# Patient Record
Sex: FEMALE | Race: WHITE
[De-identification: names, ages, dates, MRNs, and addresses within clinical notes are randomized per-mention and may not be internally consistent; named-entity substitution may affect disease eponyms.]

---

## 2017-07-25 ENCOUNTER — HOSPITAL ENCOUNTER (EMERGENCY)
Dept: HOSPITAL 25 - UCEAST | Age: 37
Discharge: HOME | End: 2017-07-25
Payer: COMMERCIAL

## 2017-07-25 VITALS — SYSTOLIC BLOOD PRESSURE: 139 MMHG | DIASTOLIC BLOOD PRESSURE: 86 MMHG

## 2017-07-25 DIAGNOSIS — R03.0: ICD-10-CM

## 2017-07-25 DIAGNOSIS — J02.8: Primary | ICD-10-CM

## 2017-07-25 DIAGNOSIS — Z88.1: ICD-10-CM

## 2017-07-25 DIAGNOSIS — Z88.2: ICD-10-CM

## 2017-07-25 DIAGNOSIS — R50.9: ICD-10-CM

## 2017-07-25 DIAGNOSIS — E28.2: ICD-10-CM

## 2017-07-25 DIAGNOSIS — Z91.048: ICD-10-CM

## 2017-07-25 DIAGNOSIS — F90.9: ICD-10-CM

## 2017-07-25 DIAGNOSIS — E66.9: ICD-10-CM

## 2017-07-25 PROCEDURE — G0463 HOSPITAL OUTPT CLINIC VISIT: HCPCS

## 2017-07-25 PROCEDURE — 99202 OFFICE O/P NEW SF 15 MIN: CPT

## 2017-07-25 PROCEDURE — 87651 STREP A DNA AMP PROBE: CPT

## 2017-07-25 NOTE — UC
Throat Pain/Nasal Jewel HPI





- HPI Summary


HPI Summary: 





38 y/o female present to the urgent care c/o of body aches, sore throat, 

headache since last night. Pt reports she woke up this morning with body aches 

that feels like razor blades going through her body, fatigue.  Pt states her 

throat is burning with difficulty swallowing.   She took some advil around 3 am 

after feeling warm.  Pain is 8/10 w/ swallowing, Denies SOB, cough, nasal 

congestion, chest pain, N/V/D or urinary symptoms.  Pt has not other complains.














- History of Current Complaint


Hx Obtained From: Patient


Hx Last Menstrual Period: PCOS last period middle of june


Pregnant?: No


Onset/Duration: Gradual Onset, Lasting Days, Still Present


Severity: Moderate


Pain Intensity: 8


Pain Scale Used: 0-10 Numeric


Associated Signs & Symptoms: Positive: Dysphagia, Fever.  Negative: Sinus 

Discomfort, Nasal Discharge, Vomiting, Rash





- Epiglottits Risk Factors


Epiglottis Risk Factors: Negative





<Lizzette Lynch - Last Filed: 07/27/17 17:12>





<Cathy Franz - Last Filed: 07/27/17 20:48>





- History of Current Complaint


Chief Complaint: UCGeneralIllness


Stated Complaint: BODY ACHES,HEADACHE/TIREDNESS


Time Seen by Provider: 07/25/17 20:47





- Allergies/Home Medications


Allergies/Adverse Reactions: 


 Allergies











Allergy/AdvReac Type Severity Reaction Status Date / Time


 


Ciprofloxacin [From Cipro] Allergy Severe Rash Verified 07/25/17 17:46


 


Sulfa Drugs Allergy Severe Rash Verified 07/25/17 17:46


 


Sulfamethoxazole Allergy Severe Rash Verified 07/25/17 17:46





w/Trimethoprim     





[From Bactrim]     


 


BANDAID Allergy Severe Rash Uncoded 07/25/17 17:46











Home Medications: 


 Home Medications





Ibuprofen TAB* [Advil TAB*] 400 mg PO QID 07/25/17 [History Confirmed 07/25/17]


Spironolactone TAB* [Aldactone TAB 25 MG*] 50 mg PO BID 07/25/17 [History 

Confirmed 07/25/17]











PMH/Surg Hx/FS Hx/Imm Hx


Previously Healthy: Yes


Other GI/ History: PCOS


Other Psychological History: ADHD





- Surgical History


Surgical History: None





- Family History


Known Family History: Positive: Cardiac Disease


Family History: Marfans





- Social History


Occupation: Employed Full-time


Lives: With Family


Alcohol Use: Rare


Substance Use Type: None


Smoking Status (MU): Never Smoked Tobacco





<OsmanjaLizzette yen - Last Filed: 07/27/17 17:12>





Review of Systems


Constitutional: Fever - at home


Skin: Negative


Eyes: Negative


ENT: Sore Throat


Respiratory: Negative


Cardiovascular: Negative


Gastrointestinal: Negative


Genitourinary: Negative


Motor: Negative


Neurovascular: Negative


Musculoskeletal: Negative


Neurological: Negative


Psychological: Negative


All Other Systems Reviewed And Are Negative: Yes





<OsmanjaLizzette yen - Last Filed: 07/27/17 17:12>





Physical Exam


Triage Information Reviewed: Yes


Appearance: Well-Appearing, No Pain Distress, Well-Nourished, Obese


Vital Signs: 


 Initial Vital Signs











Temp  99.5 F   07/25/17 17:42


 


Pulse  97   07/25/17 17:42


 


Resp  16   07/25/17 17:42


 


BP  145/98   07/25/17 17:42


 


Pulse Ox  98   07/25/17 17:42











Vital Signs Reviewed: Yes


Eye Exam: Normal


Eyes: Positive: Conjunctiva Clear - PERRLA, EOMI, fundi grossly normal


ENT: Positive: Normal ENT inspection, Hearing grossly normal, Pharyngeal 

erythema, TMs normal, Tonsillar swelling, Tonsillar exudate


Dental Exam: Normal


Neck exam: Normal


Neck: Positive: Supple, Nontender, Enlarged Nodes @ - anterior cervical 

lymphnodes tender on palpation


Respiratory Exam: Normal


Respiratory: Positive: Chest non-tender, Lungs clear, Normal breath sounds


Cardiovascular Exam: Normal


Cardiovascular: Positive: RRR, No Murmur, Pulses Normal, Brisk Capillary Refill


Abdominal Exam: Normal


Abdomen Description: Positive: Nontender, No Organomegaly, Soft.  Negative: CVA 

Tenderness (R), CVA Tenderness (L)


Bowel Sounds: Positive: Present


Musculoskeletal Exam: Normal


Musculoskeletal: Positive: Strength Intact, ROM Intact, No Edema


Neurological Exam: Normal


Psychological Exam: Normal


Skin Exam: Normal





<OsmanjaLizzette yen - Last Filed: 07/27/17 17:12>


Vital Signs: 


 Initial Vital Signs











Temp  99.5 F   07/25/17 17:42


 


Pulse  97   07/25/17 17:42


 


Resp  16 07/25/17 17:42


 


BP  145/98   07/25/17 17:42


 


Pulse Ox  98   07/25/17 17:42














<Cathy Franz - Last Filed: 07/27/17 20:48>





Throat Pain/Nasal Course/Dx





- Course


Course Of Treatment: 38 y/o female present to the urgent care c/o of body aches

, sore throat, headache since last night. Pt reports she woke up this morning 

with body aches that feels like razor blades going through her body, fatigue.  

Pt states her throat is burning with difficulty swallowing.   She took some 

advil around 3 am after feeling warm.  Pain is 8/10 w/ swallowing, Denies SOB, 

cough, nasal congestion, chest pain, N/V/D or urinary symptoms. Hx obtained.  

PE abnormal findings:ENT: Positive: Normal ENT inspection, Hearing grossly 

normal, Pharyngeal erythema, TMs normal, Tonsillar swelling, Tonsillar exudate.

  Rapid strep ordered, result: negative Dx Viral pharyngitis.  Pt Rx ibuprofen 

PO to alleviate symptoms.  Advised to increase fluid intake, rest and avoid 

strenuous exercise.  Pt BP: 145/98 w/o Hx of HTN.   Pt advised on decrese salt 

intake, monitor BP and f/u with PCP for further evaluation and treatment. Pt 

understood and agreed and left the clinic ambulating.





- Differential Dx/Diagnosis


Differential Diagnosis/HQI/PQRI: Laryngitis, Otitis Media, Pharyngitis, 

Tonsillitis


Provider Diagnoses: 1-Viral pharyngitis.  2-Elevated blood pressure w/o Hx of 

HTN





<Lizzette Lynhc - Last Filed: 07/27/17 17:12>





Discharge





<Lizzette Lynch - Last Filed: 07/27/17 17:12>





<Cathy Franz - Last Filed: 07/27/17 20:48>





- Discharge Plan


Condition: Stable


Disposition: HOME


Prescriptions: 


Ibuprofen TAB* [Motrin TAB* 800 MG] 800 mg PO Q6H #30 tab


Patient Education Materials:  Pharyngitis (ED), Low Sodium Diet (ED)


Referrals: 


Antonio Mann MD [Primary Care Provider] - 3 Days


Additional Instructions: 


Please take medications as instructed after meals, Rest increase fluid intake 

and rest.  If symptoms do not improve or worsen please return to the urgent  

care or f/u with your PCP for further treatment.  Please monitor your BP since 

it is elevated today.  Decrease salt in your diet and monitor your BP at home. 

If still elevated please f/u with PCP for further management





Attestation Statement


User Type: Provider - I was available for consult.  This patient was seen by 

the advanced practice provider.  The patient was not presented to, seen by, or 

examined by me.-Tori





<Cathy Franz - Last Filed: 07/27/17 20:48>

## 2017-08-14 ENCOUNTER — HOSPITAL ENCOUNTER (EMERGENCY)
Dept: HOSPITAL 25 - UCEAST | Age: 37
Discharge: HOME | End: 2017-08-14
Payer: COMMERCIAL

## 2017-08-14 VITALS — SYSTOLIC BLOOD PRESSURE: 138 MMHG | DIASTOLIC BLOOD PRESSURE: 87 MMHG

## 2017-08-14 PROCEDURE — G0463 HOSPITAL OUTPT CLINIC VISIT: HCPCS

## 2017-08-14 PROCEDURE — 99211 OFF/OP EST MAY X REQ PHY/QHP: CPT

## 2017-08-14 PROCEDURE — 87101 SKIN FUNGI CULTURE: CPT

## 2017-08-14 NOTE — UC
Skin Complaint HPI





- HPI Summary


HPI Summary: 





acute onset of toenail discoloration noted yesterday AM


no trauma


no new meds


asymptomatic 





2 weeks ago had an acute febrile illness (ST and fever x 3-4 days).  (-) RS





- History of Current Complaint


Chief Complaint: UCLowerExtremity


Time Seen by Provider: 08/14/17 08:59


Stated Complaint: TOENAILS ORANGE


Hx Obtained From: Patient


Hx Last Menstrual Period: 7/15/17


Onset/Duration: Sudden Onset, Gradual Onset


Onset Severity: Mild


Current Severity: Mild


Pain Intensity: 0


Pain Scale Used: 0-10 Numeric


Location: Other - left toenails and right great toenail


Aggravating: Nothing


Alleviating: Nothing





- Allergy/Home Medications


Allergies/Adverse Reactions: 


 Allergies











Allergy/AdvReac Type Severity Reaction Status Date / Time


 


Ciprofloxacin [From Cipro] Allergy Severe Rash Verified 08/14/17 08:39


 


Sulfa Drugs Allergy Severe Rash Verified 08/14/17 08:39


 


Sulfamethoxazole Allergy Severe Rash Verified 08/14/17 08:39





w/Trimethoprim     





[From Bactrim]     


 


BANDAID Allergy Severe Rash Uncoded 08/14/17 08:39














Review of Systems


Constitutional: Negative


Skin: Negative


Eyes: Negative


ENT: Negative


Respiratory: Negative


Cardiovascular: Negative


Gastrointestinal: Negative


Genitourinary: Negative


Motor: Negative


Neurovascular: Negative


Musculoskeletal: Negative


Neurological: Negative


Psychological: Negative


All Other Systems Reviewed And Are Negative: Yes





PMH/Surg Hx/FS Hx/Imm Hx


Previously Healthy: Yes





- Surgical History


Surgical History: Yes


Surgery Procedure, Year, and Place: ankle surgery, bone chips removed





- Family History


Known Family History: Positive: Cardiac Disease


Family History: Marfans





- Social History


Alcohol Use: Rare


Substance Use Type: None


Smoking Status (MU): Former Smoker





Physical Exam


Triage Information Reviewed: Yes


Appearance: Well-Appearing, No Pain Distress, Well-Nourished


Vital Signs: 


 Initial Vital Signs











Temp  98.1 F   08/14/17 08:41


 


Pulse  66   08/14/17 08:41


 


Resp  18   08/14/17 08:41


 


BP  138/87   08/14/17 08:41


 


Pulse Ox  100   08/14/17 08:41











Vital Signs Reviewed: Yes


Eyes: Positive: Conjunctiva Clear


ENT: Positive: Hearing grossly normal, TMs normal.  Negative: Nasal drainage, 

Tonsillar swelling, Tonsillar exudate, Trismus


Neck: Positive: Supple, Nontender, No Lymphadenopathy


Respiratory: Positive: Lungs clear, Normal breath sounds, No respiratory 

distress, No accessory muscle use


Cardiovascular: Positive: RRR, No Murmur


Musculoskeletal: Positive: ROM Intact, No Edema


Neurological: Positive: Alert


Skin: Positive: Other - orange toe nails Left (all), right (great toe)





Course/Dx





- Course


Course Of Treatment: orange coloration did not come off with alcohol or 

acetone.  clippings sent for fungal culture





- Diagnoses


Provider Diagnoses: nail discoloration of uncertain cause





Discharge





- Discharge Plan


Condition: Stable


Disposition: HOME


Discharge Disposition Comment: nail discoloration of uncertain cause


Referrals: 


Antonio Mann MD [Primary Care Provider] - 


Additional Instructions: 


I am unsure of the cause of your nail discoloration





fungal culture pending





You may need to see a dermatologist or podiatrist for a definative answer





I suggest you start with a follow up with your provider

## 2019-10-29 ENCOUNTER — HOSPITAL ENCOUNTER (EMERGENCY)
Dept: HOSPITAL 25 - ED | Age: 39
Discharge: HOME | End: 2019-10-29
Payer: COMMERCIAL

## 2019-10-29 VITALS — SYSTOLIC BLOOD PRESSURE: 112 MMHG | DIASTOLIC BLOOD PRESSURE: 84 MMHG

## 2019-10-29 DIAGNOSIS — R11.2: Primary | ICD-10-CM

## 2019-10-29 DIAGNOSIS — Z88.1: ICD-10-CM

## 2019-10-29 DIAGNOSIS — Z88.2: ICD-10-CM

## 2019-10-29 LAB
FLUAV RNA SPEC QL NAA+PROBE: NEGATIVE
FLUBV RNA SPEC QL NAA+PROBE: NEGATIVE

## 2019-10-29 PROCEDURE — 96374 THER/PROPH/DIAG INJ IV PUSH: CPT

## 2019-10-29 PROCEDURE — 99282 EMERGENCY DEPT VISIT SF MDM: CPT

## 2019-10-29 PROCEDURE — 96361 HYDRATE IV INFUSION ADD-ON: CPT

## 2019-10-29 PROCEDURE — 96375 TX/PRO/DX INJ NEW DRUG ADDON: CPT

## 2019-10-29 NOTE — ED
Influenza-Like Illness





- HPI Summary


HPI Summary: 





This patient is a 39 year old F presenting to [facility] with a chief complaint of 

nasal congestion worsening at 0100 this morning. She also checked her 

temperature and had temp of 101 degrees F. She took a Tylenol, however she did 

not ingest; as she vomited. She reports nausea, diarrhea, diaphoresis and 

feeling feverish even though most recently measured temp shows no fever. Pt 

also has cough, and post nasal drip. Pt denies chest pain, abdominal pain, SOB 

and constipation. She has PMHx of PCOS. She has been exposed to the flu.








- History of Current Complaint


Chief Complaint: EDNauseaVomitDiarrh


Time Seen by Provider: 10/29/19 08:09


Hx Obtained From: Patient


Onset/Duration: Gradual Onset, Worse Since - 0100


Severity: Severe


Associated Signs & Symptoms: Negative


Related Hx: Possible Flu/Infectious Exposure





- Allergy/Home Medications


Allergies/Adverse Reactions: 


 Allergies











Allergy/AdvReac Type Severity Reaction Status Date / Time


 


Sulfa (Sulfonamide Allergy  Rash Verified 10/29/19 08:07





Antibiotics)     


 


sulfamethoxazole Allergy  Rash Verified 10/29/19 08:07





[From Bactrim]     


 


trimethoprim [From Bactrim] Allergy  Rash Verified 10/29/19 08:07


 


BANDAID Allergy Severe Rash Uncoded 08/14/17 08:39














PMH/Surg Hx/FS Hx/Imm Hx


Endocrine/Hematology History: 


   Denies: Hx Diabetes, Hx Thyroid Disease


Cardiovascular History: 


   Denies: Hx Hypertension


Respiratory History: 


   Denies: Hx Asthma, Hx Chronic Obstructive Pulmonary Disease (COPD)


GI History: 


   Denies: Hx Ulcer





- Surgical History


Surgery Procedure, Year, and Place: ankle surgery, bone chips removed


Infectious Disease History: No


Infectious Disease History: 


   Denies: Hx Clostridium Difficile, Hx Hepatitis, Hx Human Immunodeficiency 

Virus (HIV), Hx of Known/Suspected MRSA, Hx Shingles, Hx Tuberculosis, Hx Known/

Suspected VRE, Hx Known/Suspected VRSA, History Other Infectious Disease, 

Traveled Outside the [place] in Last 30 Days





- Family History


Known Family History: Positive: Cardiac Disease


Family History: Marfans





- Social History


Occupation: Employed Full-time


Alcohol Use: Rare


Substance Use Type: Reports: None


Smoking Status (MU): Never Smoked Tobacco





Review of Systems


Positive: Fever, Skin Diaphoresis


Positive: Nasal Discharge


Negative: Chest Pain


Positive: Cough.  Negative: Shortness Of Breath


Positive: Vomiting, Diarrhea.  Negative: Abdominal Pain


All Other Systems Reviewed And Are Negative: Yes





Physical Exam





- Summary


Physical Exam Summary: 





Appearance: Well-appearing, Well-nourished, lying in bed comfortably


Skin: Warm, dry, no obvious rash


Eyes: sclera anicteric, no conjunctival pallor


ENT: mucous membranes moist, pharynx appears normal


Neck: Supple, nontender


Respiratory: Clear to auscultation, no signs of respiratory distress


Cardiovascular: Normal S1, S2. No murmurs. Normal distal pulses in tibial and 

radial bilaterally.


Abdomen: Soft, nontender, normal active bowel sounds present


Musculoskeletal: Normal, Strength/ROM Intact


Neurological: A&Ox3, awake and alert, mentation is normal, speech is fluent and 

appropriate


Psychiatric: affect is normal, does not appear anxious or depressed





Triage Information Reviewed: Yes


Vital Signs On Initial Exam: 


 Initial Vitals











Temp Pulse Resp BP Pulse Ox


 


 97.6 F   97   18   147/90   96 


 


 10/29/19 08:02  10/29/19 08:02  10/29/19 08:02  10/29/19 08:02  10/29/19 08:02











Vital Signs Reviewed: Yes





Procedures





- Sedation


Patient Received Moderate/Deep Sedation with Procedure: No





Diagnostics





- Vital Signs


 Vital Signs











  Temp Pulse Resp BP Pulse Ox


 


 10/29/19 08:02  97.6 F  97  18  147/90  96














- Laboratory


Lab Statement: Any lab studies that have been ordered have been reviewed, and 

results considered in the medical decision making process.





Re-Evaluation





- Re-Evaluation


  ** First Eval


Re-Evaluation Time: 10:00


Comment: Discussed results with pt and plan of care.





Flu Symptom Course/Dx





- Course


Course Of Treatment: This patient is a 39 year old F presenting to [facility] with a 

chief complaint of nasal congestion worsening at 0100 this morning. She also 

checked her temperature and had temp of 101 degrees F. She took a Tylenol, 

however she did not ingest; as she vomited. She reports nausea, diarrhea, 

diaphoresis and feeling feverish even though most recently measured temp shows 

no fever. Pt also has cough, and post nasal drip. Pt denies chest pain, 

abdominal pain, SOB and constipation. She has PMHx of PCOS. She has been 

exposed to the flu.  Influenza A and Influenza B were both negative.  In the ED 

course the patient was given toradol, fluids, and Zofran.  Patient will be 

discharged.  The patient is agreeable with this plan.





- Diagnoses


Provider Diagnoses: 


 Nausea & vomiting








Discharge ED





- Sign-Out/Discharge


Documenting (check all that apply): Patient Departure - Discharge





- Discharge Plan


Condition: Good


Disposition: HOME


Prescriptions: 


Ondansetron ODT TAB* [Zofran 4 MG Odt TAB*] 8 mg PO Q6H PRN #12 tab.odt


 PRN Reason: Nausea


Patient Education Materials:  Acute Nausea and Vomiting (ED)


Referrals: 


[facility] [Outside] - If Needed


Additional Instructions: 


I sent in a prescription for zofran to control the nausea. You can take OTC 

medication for the congestion, like mucinex.





- Billing Disposition and Condition


Condition: GOOD


Disposition: Home





- Attestation Statements


Document Initiated by Jose: Yes


Documenting Scribe: Sasha Meza


Provider For Whom Jose is Documenting (Include Credential): Kaiden Carreon MD


Scribe Attestation: 


Sasha MYERS, scribed for Kaiden Carreon MD on 10/31/19 at 1841. 


Scribe Documentation Reviewed: Yes


Provider Attestation: 


The documentation as recorded by the Sasha fischer accurately 

reflects the service I personally performed and the decisions made by me, 

Kaiden Carreon MD


Status of Scribe Document: Viewed